# Patient Record
(demographics unavailable — no encounter records)

---

## 2017-03-30 NOTE — RAD
EXAM DESCRIPTION: 



Chest,2 Views



CLINICAL HISTORY: 



CHEST PAIN 



COMPARISON: 



None available 



FINDINGS: 



The cardiomediastinal silhouette is unremarkable. There is no

airspace consolidation or pleural effusion. The lung volumes are

at the upper limits of normal range. There is no pneumothorax or

acute fracture.



IMPRESSION: 



Upper normal lung volumes, the possibility of COPD should be

considered. No additional intrathoracic abnormality to explain

chest pain.



Electronically signed by:  Alejandro Amanda MD  3/30/2017 7:57 AM CDT

## 2019-06-13 NOTE — RAD
EXAM: Hip,Left 2 Views



CLINICAL HISTORY: PAIN IN LEFT HIP.



TECHNIQUE: AP, lateral images.



COMPARISON STUDY:  None

 

FINDINGS: Bone structures and joint spaces appear normal.    No

fracture or dislocation identified.   

 

IMPRESSION:  Negative left hip. 



Electronically signed by:  Xavier Avila MD  6/13/2019 3:19 PM CDT

Workstation: [id number]

## 2019-06-13 NOTE — RAD
EXAM: Lumbar Spine 5 Views



CLINICAL HISTORY: LOW BACK PAIN



COMPARISON STUDY:  None

 

TECHNICAL: AP, lateral and flexion/extension x-rays of the lumbar

spine. 



FINDINGS: No fracture or malalignment. No abnormal motion with

flexion/extension. Very mild disc height loss is present at the

last 3 levels without significant endplate degenerative changes.

Moderate facet arthropathy is seen at the last 2 levels.

    

IMPRESSION: Moderate facet arthropathy at the last 2 levels of

lumbar spine.



 



Electronically signed by:  Xavier Avila MD  6/13/2019 3:20 PM CDT

Workstation: [id number]